# Patient Record
Sex: MALE | Race: WHITE
[De-identification: names, ages, dates, MRNs, and addresses within clinical notes are randomized per-mention and may not be internally consistent; named-entity substitution may affect disease eponyms.]

---

## 2019-01-21 ENCOUNTER — HOSPITAL ENCOUNTER (EMERGENCY)
Dept: HOSPITAL 11 - JP.ED | Age: 64
Discharge: HOME | End: 2019-01-21
Payer: MEDICAID

## 2019-01-21 VITALS — DIASTOLIC BLOOD PRESSURE: 76 MMHG | SYSTOLIC BLOOD PRESSURE: 131 MMHG

## 2019-01-21 DIAGNOSIS — I10: ICD-10-CM

## 2019-01-21 DIAGNOSIS — Z79.899: ICD-10-CM

## 2019-01-21 DIAGNOSIS — C41.9: ICD-10-CM

## 2019-01-21 DIAGNOSIS — F17.210: ICD-10-CM

## 2019-01-21 DIAGNOSIS — E78.00: ICD-10-CM

## 2019-01-21 DIAGNOSIS — Z88.1: ICD-10-CM

## 2019-01-21 DIAGNOSIS — G89.3: Primary | ICD-10-CM

## 2019-01-21 DIAGNOSIS — C79.82: ICD-10-CM

## 2019-01-21 PROCEDURE — 99283 EMERGENCY DEPT VISIT LOW MDM: CPT

## 2019-01-21 PROCEDURE — 96374 THER/PROPH/DIAG INJ IV PUSH: CPT

## 2019-01-21 NOTE — EDM.PDOC
ED HPI GENERAL MEDICAL PROBLEM





- General


Chief Complaint: Back Pain or Injury


Stated Complaint: MEDICAL VIA NORTH


Time Seen by Provider: 01/21/19 14:08


Source of Information: Reports: Patient


History Limitations: Reports: No Limitations





- History of Present Illness


INITIAL COMMENTS - FREE TEXT/NARRATIVE: 





This gentleman has metastatic prostate cancer as had been having a lot of 

problems with bone pain. [facility] not have a [facility] 

doctor. He's medicated with Dilaudid 2 mg every 4 hours and says that just isn'

t holding the pain. This morning he was hurting so badly that he was unable to 

get out of his recliner. EMS was called they picked him up they gave 2 mg of 

Dilaudid IV in route to the hospital. That gave good temporary pain relief.


  ** Middle Back


Pain Score (Numeric/FACES): 3





- Related Data


 Allergies











Allergy/AdvReac Type Severity Reaction Status Date / Time


 


clindamycin Allergy  Chest Pain Verified 01/21/19 12:20











Home Meds: 


 Home Meds





Lisinopril 10 mg PO DAILY 09/03/15 [History]


Calcium Carbonate/Vitamin D3 [Calcium 250+D] 2 tab PO BID 01/21/19 [History]


Chlorhexidine Gluconate 0.12% [Peridex 0.12% Rinse] 1 dose FLUSH BID 01/21/19 [

History]


Dexamethasone 4 mg PO DAILY 01/21/19 [History]


HYDROmorphone [Dilaudid] 2 mg PO Q4H PRN 01/21/19 [History]


atorvaSTATin [Lipitor] 20 mg PO DAILY 01/21/19 [History]











Past Medical History


HEENT History: Reports: Cataract


Cardiovascular History: Reports: High Cholesterol, Hypertension


Other Gastrointestinal History: elevated lft


Genitourinary History: Reports: Other (See Below)


Other Genitourinary History: prostate CA


Other Musculoskeletal History: bone mets hips shoulder spine


Oncologic (Cancer) History: Reports: Bone, Prostate


Other Dermatologic History: thrush





- Infectious Disease History


Infectious Disease History: Reports: Chicken Pox, Other (See Below)


Other Infectious Disease History: staph infection in knee





- Past Surgical History


HEENT Surgical History: Reports: Cataract Surgery, Other (See Below)


Other HEENT Surgeries/Procedures: Jaw surgery - tooth pulled, woudn't heal so 

removed 2/3 of jaw bone


GI Surgical History: Reports: Colonoscopy


Male  Surgical History: Reports: Prostate Biopsy


Musculoskeletal Surgical History: Reports: Shoulder Surgery


Other Musculoskeletal Surgeries/Procedures:: dislocated bilat hips





Social & Family History





- Tobacco Use


Smoking Status *Q: Current Every Day Smoker


Years of Tobacco use: 50


Packs/Tins Daily: 1


Month/Year Tobacco Last Used: January





- Caffeine Use


Caffeine Use: Reports: Coffee





- Alcohol Use


Days Per Week of Alcohol Use: 3


Number of Drinks Per Day: 2


Total Drinks Per Week: 6





- Recreational Drug Use


Recreational Drug Use: No





ED ROS GENERAL





- Review of Systems


Review Of Systems: See Below


Constitutional: Reports: No Symptoms


HEENT: Reports: No Symptoms


Respiratory: Reports: No Symptoms


Cardiovascular: Reports: No Symptoms


Endocrine: Reports: No Symptoms


GI/Abdominal: Reports: No Symptoms


: Reports: No Symptoms





ED EXAM,LOWER BACK PAIN/INJURY





- Physical Exam


Exam: See Below


Exam Limited By: No Limitations


General Appearance: Alert, WD/WN, Moderate Distress, Other (Patient is 

complaining of a great deal of pain.)


Eye Exam: Bilateral Eye: Normal Inspection


Throat/Mouth: Normal Inspection


Respiratory/Chest: No Respiratory Distress


Cardiovascular: Regular Rate, Rhythm


Extremities: Normal Inspection


Neurological: Alert, Normal Mood/Affect


Psychiatric: Normal Affect


Skin Exam: Warm, Dry





Course





- Vital Signs


Last Recorded V/S: 





 Last Vital Signs











Temp  36.3 C   01/21/19 12:33


 


Pulse  74   01/21/19 14:08


 


Resp  16   01/21/19 14:08


 


BP  131/76   01/21/19 14:08


 


Pulse Ox  97   01/21/19 14:08














- Orders/Labs/Meds


Meds: 





Medications














Discontinued Medications














Generic Name Dose Route Start Last Admin





  Trade Name Rafa  PRN Reason Stop Dose Admin


 


Hydromorphone HCl  2 mg  01/21/19 14:12  01/21/19 14:24





  Dilaudid  IVPUSH  01/21/19 14:13  2 mg





  ONETIME ONE   Administration





     





     





     





     














- Re-Assessments/Exams


Free Text/Narrative Re-Assessment/Exam: 





01/21/19 15:28


This gentleman was given Dilaudid 2 mg IV. This gave good pain relief. I 

discussed the use of opiates and cancer pain and explained to him how he needs 

to adjust the dose as needed to keep the pain under control. Because today is a 

national holiday I don't think there is any chance [facility] [place]. Putting him on a long-acting opiate or something like a fentanyl 

patch would be a good idea however will at the [facility] people worry about that.





Departure





- Departure


Time of Disposition: 15:29


Disposition: Home, Self-Care 01


Condition: Fair


Clinical Impression: 


 Cancer related pain








- Discharge Information


Referrals: 


PCP,None [Primary Care Provider] - 


Additional Instructions: 


Increase the hydromorphone dose as needed. The first change would be to go up 

to 4 mg and see how long that dose lasts. It may last for just 4 hours or it 

may even last 6. Be sure to take the next dose before the previous dose wears 

off completely. Contact your Dr. about making changes to the medications. This 

medication will cause sedation if you take too much so just be careful with it

## 2019-03-09 ENCOUNTER — HOSPITAL ENCOUNTER (EMERGENCY)
Dept: HOSPITAL 11 - JP.ED | Age: 64
Discharge: NURSING, REHAB OR LONG-TERM CARE | End: 2019-03-09
Payer: MEDICARE

## 2019-03-09 VITALS — SYSTOLIC BLOOD PRESSURE: 157 MMHG | DIASTOLIC BLOOD PRESSURE: 93 MMHG

## 2019-03-09 DIAGNOSIS — I21.4: Primary | ICD-10-CM

## 2019-03-09 DIAGNOSIS — E78.00: ICD-10-CM

## 2019-03-09 DIAGNOSIS — Z79.899: ICD-10-CM

## 2019-03-09 DIAGNOSIS — Z88.1: ICD-10-CM

## 2019-03-09 DIAGNOSIS — F17.210: ICD-10-CM

## 2019-03-09 DIAGNOSIS — I10: ICD-10-CM

## 2019-03-09 PROCEDURE — 96376 TX/PRO/DX INJ SAME DRUG ADON: CPT

## 2019-03-09 PROCEDURE — 85025 COMPLETE CBC W/AUTO DIFF WBC: CPT

## 2019-03-09 PROCEDURE — 83880 ASSAY OF NATRIURETIC PEPTIDE: CPT

## 2019-03-09 PROCEDURE — 80053 COMPREHEN METABOLIC PANEL: CPT

## 2019-03-09 PROCEDURE — 96374 THER/PROPH/DIAG INJ IV PUSH: CPT

## 2019-03-09 PROCEDURE — 36415 COLL VENOUS BLD VENIPUNCTURE: CPT

## 2019-03-09 PROCEDURE — 99285 EMERGENCY DEPT VISIT HI MDM: CPT

## 2019-03-09 PROCEDURE — 71275 CT ANGIOGRAPHY CHEST: CPT

## 2019-03-09 PROCEDURE — 93005 ELECTROCARDIOGRAM TRACING: CPT

## 2019-03-09 PROCEDURE — 84132 ASSAY OF SERUM POTASSIUM: CPT

## 2019-03-09 PROCEDURE — 83605 ASSAY OF LACTIC ACID: CPT

## 2019-03-09 PROCEDURE — 96361 HYDRATE IV INFUSION ADD-ON: CPT

## 2019-03-09 PROCEDURE — 71046 X-RAY EXAM CHEST 2 VIEWS: CPT

## 2019-03-09 PROCEDURE — 84484 ASSAY OF TROPONIN QUANT: CPT

## 2019-03-09 PROCEDURE — 82553 CREATINE MB FRACTION: CPT

## 2019-03-09 NOTE — EDM.PDOC
ED HPI GENERAL MEDICAL PROBLEM





- General


Chief Complaint: General


Stated Complaint: FALL VIA NORTH


Time Seen by Provider: 03/09/19 09:55


Source of Information: Reports: Patient, Family, Provider, RN Notes Reviewed


History Limitations: Reports: No Limitations





- History of Present Illness


INITIAL COMMENTS - FREE TEXT/NARRATIVE: 





63-year-old gentleman presents to emergency department day complaint of 

shortness of breath, he arrives via EMS services he has a known history of 

prostate cancer with metastases to bone currently on chemotherapy was sitting 

in his recliner was slumped over in his recliner this was witnessed by his wife 

he fell out of his recliner landing on a carpeted floor was unresponsive after 

the fall started to respond when he was rolled on his back and EMS services 

were present. At this time he states he does get a little short of breath 

however is denying any pain he does have some abrasions on his forehead and 

nose however describes no pain with palpation to that area. Initially he had 

refused evaluation and treatment at this facility [facility] contact the [facility] on call at the [facility] in 

[place] they declined acceptance until he has been evaluated they felt they could 

not provide services for him after possible trauma. I discussed this with the 

patient he is agreement to evaluation and treatment at this time





- Related Data


 Allergies











Allergy/AdvReac Type Severity Reaction Status Date / Time


 


clindamycin Allergy  Chest Pain Verified 03/09/19 09:16











Home Meds: 


 Home Meds





Lisinopril 10 mg PO DAILY 09/03/15 [History]


Calcium Carbonate/Vitamin D3 [Calcium 250+D] 2 tab PO BID 01/21/19 [History]


Chlorhexidine Gluconate 0.12% [Peridex 0.12% Rinse] 1 dose FLUSH BID 01/21/19 [

History]


Dexamethasone 4 mg PO DAILY 01/21/19 [History]


HYDROmorphone [Dilaudid] 2 mg PO Q4H PRN 01/21/19 [History]


atorvaSTATin [Lipitor] 20 mg PO DAILY 01/21/19 [History]











Past Medical History


HEENT History: Reports: Cataract


Cardiovascular History: Reports: High Cholesterol, Hypertension


Other Gastrointestinal History: elevated lft


Genitourinary History: Reports: Other (See Below)


Other Genitourinary History: prostate CA


Other Musculoskeletal History: bone mets hips shoulder spine


Oncologic (Cancer) History: Reports: Bone, Prostate


Other Dermatologic History: thrush





- Infectious Disease History


Infectious Disease History: Reports: Chicken Pox, Other (See Below)


Other Infectious Disease History: staph infection in knee





- Past Surgical History


HEENT Surgical History: Reports: Cataract Surgery, Other (See Below)


Other HEENT Surgeries/Procedures: Jaw surgery - tooth pulled, woudn't heal so 

removed 2/3 of jaw bone


GI Surgical History: Reports: Colonoscopy


Male  Surgical History: Reports: Prostate Biopsy


Musculoskeletal Surgical History: Reports: Shoulder Surgery


Other Musculoskeletal Surgeries/Procedures:: dislocated bilat hips





Social & Family History





- Tobacco Use


Smoking Status *Q: Current Every Day Smoker


Years of Tobacco use: 50


Packs/Tins Daily: 1





- Caffeine Use


Caffeine Use: Reports: Coffee





ED ROS GENERAL





- Review of Systems


Review Of Systems: See Below


Constitutional: Denies: Fever, Chills, Weakness


HEENT: Reports: No Symptoms


Respiratory: Reports: Shortness of Breath


Cardiovascular: Reports: No Symptoms


GI/Abdominal: Reports: No Symptoms


: Reports: No Symptoms


Musculoskeletal: Reports: Back Pain (Chronic)


Skin: Reports: Bruising, Wound


Neurological: Reports: No Symptoms





ED EXAM, GENERAL





- Physical Exam


Exam: See Below


Free Text/Narrative:: 





General: Male, not in any distress, alert and oriented x3 HEENT: head is 

superficial abrasion appreciated on the forehead and a small laceration across 

the bridge of the nose there is no tenderness to palpation across the forehead 

or bridge of nose normocephalic, eyes pupils equal round reactive to light, 

sclera clear no conjunctivitis appreciated extraocular eye movements intact.  

Ears tympanic membranes clear and gray landmarks and light reflex are present 

bilaterally canals are clear.  Nose no septal deviation, nares are clear, no 

blood present.  Mouth mucosa is dry and pink no erythema or exudate noted in 

soft palate, tongue is midline uvula is midline, dentition is intact.


Neck: Supple no thyromegaly no tracheal deviation.


Nodes: Cervical nodes subclavicular nodes nontender no palpable lymphadenopathy 

noted.


Lungs: Coarse breath sounds with rhonchi mid to lower lung fields bilaterally 

crackles in the bases


CV: Regular rate and rhythm S1 and S2 appreciated no murmurs rubs or gallops 

noted.


Abdomen: Soft, obese, nontender, no palpable masses or organomegaly appreciated

, no distention no guarding bowel sounds are present,


Neuro: Cranial nerves II through XII intact, GCS of 15


Skin: Multiple bruising with ecchymosis is appreciated on the abdomen several 

days old


Extremities: +2 pitting edema bilaterally, pedal pulse is +2.





Course





- Vital Signs


Last Recorded V/S: 


 Last Vital Signs











Temp  98.1 F   03/09/19 09:26


 


Pulse  103 H  03/09/19 09:40


 


Resp  18   03/09/19 09:40


 


BP  88/48 L  03/09/19 09:40


 


Pulse Ox  94 L  03/09/19 09:40














- Orders/Labs/Meds


Orders: 


 Active Orders 24 hr











 Category Date Time Status


 


 Cardiac Monitoring [RC] .As Directed Care  03/09/19 10:10 Active


 


 Cardiac Monitoring [RC] STAT Care  03/09/19 11:47 Active


 


 Communication Order [RC] Per Unit Routine Care  03/09/19 11:47 Active


 


 Communication Order [RC] Per Unit Routine Care  03/09/19 11:47 Active


 


 EKG Documentation Completion [RC] ASDIRECTED Care  03/09/19 10:12 Active


 


 Oxygen Therapy [RC] ASDIRECTED Care  03/09/19 11:47 Active


 


 Heparin Sodium/D5W [Heparin 25,000 Units in D5W 500 ML] Med  03/09/19 12:00 

Active





 25,000 units in 500 ml IV TITRATE   


 


 Iopamidol [Isovue-370 (76%)] Med  03/09/19 12:45 Active





 100 ml IV .AS DIRECTED   


 


 Lactated Ringers [Ringers, Lactated] 1,000 ml Med  03/09/19 10:15 Active





 IV ASDIRECTED   


 


 Sodium Chloride 0.9% [Normal Saline] 100 ml Med  03/09/19 12:45 Active





 IV ASDIRECTED   


 


 EKG 12 Lead [EK] Stat Ther  03/09/19 10:11 Ordered








 Medication Orders





Lactated Ringer's (Ringers, Lactated)  1,000 mls @ 999 mls/hr IV ASDIRECTED MODESTO


   Last Admin: 03/09/19 12:09  Dose: 999 mls/hr


   Infusion: 03/09/19 12:09  Dose: 999 mls/hr


   Admin: 03/09/19 11:45  Dose: 999 mls/hr


Heparin Sodium/Dextrose (Heparin 25,000 Units In D5w 500 Ml)  25,000 units in 

500 mls @ 27.433 mls/hr IV TITRATE MODESTO; Protocol


   Last Admin: 03/09/19 13:28  Dose: 12 units/kg/hr, 27.433 mls/hr


Sodium Chloride (Normal Saline)  100 mls @ 3 mls/sec IV ASDIRECTED MODESTO


   Last Admin: 03/09/19 13:20  Dose: 3 mls/sec


Iopamidol (Isovue-370 (76%))  100 ml IV .AS DIRECTED MODESTO


   Last Admin: 03/09/19 13:20  Dose: 100 ml








Labs: 


 Laboratory Tests











  03/09/19 03/09/19 03/09/19 Range/Units





  09:45 09:45 09:45 


 


WBC  18.1 H    (4.5-11.0)  K/uL


 


RBC  4.75    (4.30-5.90)  M/uL


 


Hgb  15.1 H    (12.0-15.0)  g/dL


 


Hct  46.9    (40.0-54.0)  %


 


MCV  99 H    (80-98)  fL


 


MCH  32 H    (27-31)  pg


 


MCHC  32    (32-36)  %


 


Plt Count  169    (150-400)  K/uL


 


Add Manual Diff  Yes    


 


Neutrophils % (Manual)  90 H    (36-66)  %


 


Band Neutrophils %  8    (5-11)  %


 


Monocytes % (Manual)  2    (2-6)  %


 


Sodium   145   (140-148)  mmol/L


 


Potassium   1.6 L*   (3.6-5.2)  mmol/L


 


Chloride   89 L   (100-108)  mmol/L


 


Carbon Dioxide   47 H   (21-32)  mmol/L


 


Anion Gap   10.6   (5.0-14.0)  mmol/L


 


BUN   49 H D   (7-18)  mg/dL


 


Creatinine   1.6 H D   (0.8-1.3)  mg/dL


 


Est Cr Clr Drug Dosing   44.53   mL/min


 


Estimated GFR (MDRD)   44 L   (>60)  


 


Glucose   139 H   ()  mg/dL


 


Lactic Acid    4.0 H  (0.4-2.0)  mmol/L


 


Calcium   9.5   (8.5-10.1)  mg/dL


 


Total Bilirubin   1.9 H D   (0.2-1.0)  mg/dL


 


AST   76 H D   (15-37)  U/L


 


ALT   188 H   (12-78)  U/L


 


Alkaline Phosphatase   233 H   ()  U/L


 


CK-MB (CK-2)   3.7 H   (0-3.6)  mg/mL


 


Troponin I   0.605 H*   (0.000-0.056)  ng/mL


 


NT-Pro-B Natriuret Pep   3987 H   (5-125)  pg/mL


 


Total Protein   6.0 L   (6.4-8.2)  g/dL


 


Albumin   2.7 L   (3.4-5.0)  g/dL


 


Globulin   3.3   (2.3-3.5)  g/dL


 


Albumin/Globulin Ratio   0.8 L   (1.2-2.2)  











Meds: 


Medications











Generic Name Dose Route Start Last Admin





  Trade Name Rafa  PRN Reason Stop Dose Admin


 


Lactated Ringer's  1,000 mls @ 999 mls/hr  03/09/19 10:15  03/09/19 12:09





  Ringers, Lactated  IV   999 mls/hr





  ASDIRECTED MODESTO   Administration





     





     





     





     


 


Heparin Sodium/Dextrose  25,000 units in 500 mls @ 27.433 mls/hr  03/09/19 12:

00  03/09/19 13:28





  Heparin 25,000 Units In D5w 500 Ml  IV   12 units/kg/hr





  TITRATE MODESTO   27.433 mls/hr





     Administration





     





  Protocol   





  12 UNITS/KG/HR   


 


Sodium Chloride  100 mls @ 3 mls/sec  03/09/19 12:45  03/09/19 13:20





  Normal Saline  IV   3 mls/sec





  ASDIRECTED MODESTO   Administration





     





     





     





     


 


Iopamidol  100 ml  03/09/19 12:45  03/09/19 13:20





  Isovue-370 (76%)  IV   100 ml





  .AS DIRECTED MODESTO   Administration





     





     





     





     














Discontinued Medications














Generic Name Dose Route Start Last Admin





  Trade Name Rafa  PRN Reason Stop Dose Admin


 


Aspirin  324 mg  03/09/19 11:47  03/09/19 12:05





  Aspirin  PO  03/09/19 11:48  324 mg





  ONETIME ONE   Administration





     





     





     





     


 


Clopidogrel Bisulfate  300 mg  03/09/19 11:47  03/09/19 12:06





  Plavix  PO  03/09/19 11:48  300 mg





  ONETIME ONE   Administration





     





     





     





     


 


Heparin Sodium (Porcine)  4,000 units  03/09/19 11:47  03/09/19 12:07





  Heparin Sodium  IVPUSH  03/09/19 11:48  4,000 units





  ONETIME ONE   Administration





     





     





     





     


 


Potassium Chloride 20 meq/  112 mls @ 56 mls/hr  03/09/19 12:00  03/09/19 13:30





Lidocaine HCl 2 ml/ Sodium  IV  03/09/19 13:59  56 mls/hr





Chloride  ONETIME ONE   Administration





     





     





     





     


 


Sodium Chloride  10 ml  03/09/19 12:36  03/09/19 13:20





  Saline Flush  FLUSH  03/09/19 12:37  10 ml





  ONETIME ONE   Administration





     





     





     





     














- Re-Assessments/Exams


Free Text/Narrative Re-Assessment/Exam: 





03/09/19 12:12





Initially called discussed case with Dr. [facility] 

Administration is not appropriate care for this gentleman recommended transfer 

to higher level of care.





Called and discussed the case with Dr. Townsend at 1210 after reviewing labs 

consideration of pulmonary embolism before transfer therefore CT scan [place] 

chest was ordered in the meantime is currently being treated for non-STEMI with 

aspirin Plavix and heparin pending results of CT scan at which time transfer 

will be coordinated





Departure





- Departure


Time of Disposition: 14:26


Disposition: DC/Tfer to Acute Hospital [facility]


Condition: Poor


Clinical Impression: 


 Non-ST elevation myocardial infarction (NSTEMI)








- Discharge Information


Referrals: 


PCP,None [Primary Care Provider] - 


Forms:  ED Department Discharge





- My Orders


Last 24 Hours: 


My Active Orders





03/09/19 10:10


Cardiac Monitoring [RC] .As Directed 





03/09/19 10:11


EKG 12 Lead [EK] Stat 





03/09/19 10:12


EKG Documentation Completion [RC] ASDIRECTED 





03/09/19 10:15


Lactated Ringers [Ringers, Lactated] 1,000 ml IV ASDIRECTED 





03/09/19 11:47


Cardiac Monitoring [RC] STAT 


Communication Order [RC] Per Unit Routine 


Communication Order [RC] Per Unit Routine 


Oxygen Therapy [RC] ASDIRECTED 





03/09/19 12:00


Heparin Sodium/D5W [Heparin 25,000 Units in D5W 500 ML] 25,000 units in 500 ml 

IV TITRATE 





03/09/19 12:45


Iopamidol [Isovue-370 (76%)]   100 ml IV .AS DIRECTED 


Sodium Chloride 0.9% [Normal Saline] 100 ml IV ASDIRECTED 














- Assessment/Plan


Last 24 Hours: 


My Active Orders





03/09/19 10:10


Cardiac Monitoring [RC] .As Directed 





03/09/19 10:11


EKG 12 Lead [EK] Stat 





03/09/19 10:12


EKG Documentation Completion [RC] ASDIRECTED 





03/09/19 10:15


Lactated Ringers [Ringers, Lactated] 1,000 ml IV ASDIRECTED 





03/09/19 11:47


Cardiac Monitoring [RC] STAT 


Communication Order [RC] Per Unit Routine 


Communication Order [RC] Per Unit Routine 


Oxygen Therapy [RC] ASDIRECTED 





03/09/19 12:00


Heparin Sodium/D5W [Heparin 25,000 Units in D5W 500 ML] 25,000 units in 500 ml 

IV TITRATE 





03/09/19 12:45


Iopamidol [Isovue-370 (76%)]   100 ml IV .AS DIRECTED 


Sodium Chloride 0.9% [Normal Saline] 100 ml IV ASDIRECTED 











Plan: 





Assessment





Acuity = acute





Site and laterality = non-ST elevation myocardial infarction comp came the 

patient with known history of prostate cancer with metastases to the bone  





Etiology  = probable underlying coronary artery disease  





Manifestations = dyspnea 





Location of injury =  Home





Lab values = WBC elevated 18.1 consistent leukocytosis, potassium low at 1.6 

consistent hypokalemia creatinine elevated 1.6 consistent with acute renal 

failure stage G IIIB lactic acid elevated 4.0 consistent lactic acidosis total 

bilirubin elevated 1.9 consistent hyperbilirubinemia AST elevated 76 ALTs 

elevated 188 consistent elevated liver enzymes troponin elevated 0.605 probably 

related to non-ST myocardial infarction BNP elevated 3897 consistent fluid 

overload type pattern EKG shows sinus rhythm with no ST elevation she does have 

ST depressions in V4 as well as PVCs, chest x-ray shows cardiomegaly CT scan of 

the chest reveals no pulmonary embolism however he does have groundglass 

appearance in the upper lobes of uncertain significance 





Plan


Call discussed case with Dr. Curry [facility] kindly accepted the 

patient in transport he'll be transported via EMS ground he has received aspirin

, Plavix 300 mg, 4000 unit bolus of heparin and will be initiated on a heparin 

drip in route he is remained chest pain-free while in the emergency department 

















 This note was dictated using dragon voice recognition software please call 

with any questions on syntax or grammar.

## 2019-03-09 NOTE — CRLCR
HISTORY:



Shortness of breath.



FINDINGS:



PA and lateral view of the chest is provided. The lung volumes appear 

slightly diminished but grossly clear. No findings for pleural effusion or 

pneumothorax. Cardiac silhouette size appears mildly enlarged.



IMPRESSION:



No findings for pneumonia or congestive heart failure. Cardiac silhouette 

size is marginally enlarged.



Dictated by Dmitry Borja MD @ Mar  9 2019 11:48AM



Signed by Dr. Dmitry Borja @ Mar  9 2019 11:52AM

## 2019-03-09 NOTE — CRLCT
INDICATION:



HYPOXIC.  HISTORY OF PROSTATE CANCER.



TECHNIQUE :



CT scan of the chest.



CTA PE protocol.  IV contrast.



IV CONTRAST: ISOVUE 370 100 ML 



Please note that all CT scans at this facility use dose modulation, 

iterative reconstruction and/or weight-based dosing when appropriate to 

reduce radiation dose to as low as reasonably achievable(ALARA). 



COMPARISON:



No comparison chest CTA. Chest radiographs earlier on 03/09/2019. 



FINDINGS:



 CT images: Calcified gallstone in the right upper abdomen. Patchy 

reticular opacities in the right upper lung zone are suspected.  Heart and 

mediastinal contours within normal limits. 



Pulmonary arteries: Main pulmonary artery normal in caliber. No focal 

pulmonary artery filling defects. 



Thoracic aorta: Thoracic aorta is normal in caliber.  No dissection. 



Heart and mediastinum: Minimal calcified coronary artery disease, including 

left main coronary artery, series 6, image 69. Heart size normal. No 

pericardial effusion. No pathologically enlarged mediastinal or hilar lymph 

nodes. 



Lungs and pleura: Patchy ground-glass opacities in the bilateral upper 

lobes. Lesser degree of involvement involving superior segments of right 

and left lower lobes. No cavitary nodules, pneumothorax, or pleural 

effusions. 



Chest wall and soft tissues: Chest wall soft tissues unremarkable. 



Thyroid gland:  8 mm hypodense right thyroid nodule on series 6, image 15. 



Upper abdomen: Large calcified gallstone, measuring 3.4 cm on series 6, 

image 154. Bilateral adrenal gland thickening, likely due to underlying 

adrenal hyperplasia. Splenic size is normal. No hydronephrosis involving 

the visualized kidneys. Pancreas unremarkable. 



Bones: Numerous sclerotic osseous metastases in the thorax. 



IMPRESSION:



1. No acute pulmonary embolism, thoracic aortic aneurysm or dissection. 



2. Bilateral ground-glass opacities involving the upper lobes and to a 

lesser degree superior segments of bilateral lower lobes.  Findings are 

nonspecific, and may include atypical infection. If the patient is 

immunocompromised, pneumocystis pneumonia included in differential. Less 

likely differential includes pulmonary edema or possible hypersensitivity 

pneumonitis. 



3. 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 

history of prostate cancer. 



4. Cholelithiasis.



Dictated by Jeison Bravo MD @ 3/9/2019 2:07:26 PM



Please note that all CT scans at this facility use dose modulation, 

iterative reconstruction, and/or weight-based dosing when appropriate to 

reduce radiation dose to as low as reasonably achievable.



Dictated by: Jeison Bravo MD @ 03/09/2019 14:07:39



(Electronically Signed)